# Patient Record
Sex: MALE | Race: WHITE | NOT HISPANIC OR LATINO | Employment: OTHER | URBAN - NONMETROPOLITAN AREA
[De-identification: names, ages, dates, MRNs, and addresses within clinical notes are randomized per-mention and may not be internally consistent; named-entity substitution may affect disease eponyms.]

---

## 2018-01-25 ENCOUNTER — DOCUMENTATION ONLY (OUTPATIENT)
Dept: FAMILY MEDICINE | Facility: OTHER | Age: 68
End: 2018-01-25

## 2018-01-25 RX ORDER — IBUPROFEN 200 MG
400 TABLET ORAL DAILY PRN
COMMUNITY

## 2018-01-25 RX ORDER — COVID-19 ANTIGEN TEST
1 KIT MISCELLANEOUS DAILY PRN
COMMUNITY

## 2018-01-25 RX ORDER — DOXAZOSIN 4 MG/1
4 TABLET ORAL AT BEDTIME
COMMUNITY

## 2018-01-25 RX ORDER — ACETAMINOPHEN 325 MG/1
2 TABLET ORAL DAILY PRN
COMMUNITY

## 2021-08-16 ENCOUNTER — TRANSFERRED RECORDS (OUTPATIENT)
Dept: HEALTH INFORMATION MANAGEMENT | Facility: CLINIC | Age: 71
End: 2021-08-16

## 2021-10-05 ENCOUNTER — TELEPHONE (OUTPATIENT)
Dept: PET IMAGING | Facility: HOSPITAL | Age: 71
End: 2021-10-05

## 2021-10-06 ENCOUNTER — HOSPITAL ENCOUNTER (OUTPATIENT)
Dept: PET IMAGING | Facility: HOSPITAL | Age: 71
Discharge: HOME | End: 2021-10-06
Attending: FAMILY MEDICINE | Admitting: FAMILY MEDICINE
Payer: COMMERCIAL

## 2021-10-06 DIAGNOSIS — R91.8 LUNG MASS: ICD-10-CM

## 2021-10-06 PROCEDURE — 343N000001 HC RX 343: Performed by: RADIOLOGY

## 2021-10-06 PROCEDURE — 78815 PET IMAGE W/CT SKULL-THIGH: CPT | Mod: PI

## 2021-10-06 PROCEDURE — A9552 F18 FDG: HCPCS | Performed by: RADIOLOGY

## 2021-10-06 RX ADMIN — FLUDEOXYGLUCOSE F-18 12.59 MCI.: 500 INJECTION, SOLUTION INTRAVENOUS at 08:16

## 2023-03-08 ENCOUNTER — TRANSFERRED RECORDS (OUTPATIENT)
Dept: HEALTH INFORMATION MANAGEMENT | Facility: OTHER | Age: 73
End: 2023-03-08
Payer: COMMERCIAL

## 2023-03-08 ENCOUNTER — ANCILLARY PROCEDURE (OUTPATIENT)
Dept: RADIOLOGY | Facility: CLINIC | Age: 73
End: 2023-03-08
Payer: COMMERCIAL

## 2023-03-09 ENCOUNTER — TRANSFERRED RECORDS (OUTPATIENT)
Dept: HEALTH INFORMATION MANAGEMENT | Facility: OTHER | Age: 73
End: 2023-03-09

## 2023-03-09 ENCOUNTER — HOSPITAL ENCOUNTER (INPATIENT)
Facility: OTHER | Age: 73
LOS: 4 days | Discharge: HOME | DRG: 390 | End: 2023-03-13
Attending: FAMILY MEDICINE | Admitting: FAMILY MEDICINE
Payer: COMMERCIAL

## 2023-03-09 DIAGNOSIS — K56.609 SMALL BOWEL OBSTRUCTION (H): Primary | ICD-10-CM

## 2023-03-09 PROCEDURE — 99222 1ST HOSP IP/OBS MODERATE 55: CPT | Performed by: FAMILY MEDICINE

## 2023-03-09 PROCEDURE — 120N000001 HC R&B MED SURG/OB

## 2023-03-09 PROCEDURE — 258N000001 HC RX 258: Performed by: FAMILY MEDICINE

## 2023-03-09 RX ORDER — NALOXONE HYDROCHLORIDE 0.4 MG/ML
0.2 INJECTION, SOLUTION INTRAMUSCULAR; INTRAVENOUS; SUBCUTANEOUS
Status: DISCONTINUED | OUTPATIENT
Start: 2023-03-09 | End: 2023-03-13 | Stop reason: HOSPADM

## 2023-03-09 RX ORDER — NALOXONE HYDROCHLORIDE 0.4 MG/ML
0.4 INJECTION, SOLUTION INTRAMUSCULAR; INTRAVENOUS; SUBCUTANEOUS
Status: DISCONTINUED | OUTPATIENT
Start: 2023-03-09 | End: 2023-03-13 | Stop reason: HOSPADM

## 2023-03-09 RX ORDER — HYDROMORPHONE HCL IN WATER/PF 6 MG/30 ML
0.4 PATIENT CONTROLLED ANALGESIA SYRINGE INTRAVENOUS
Status: DISCONTINUED | OUTPATIENT
Start: 2023-03-09 | End: 2023-03-13 | Stop reason: HOSPADM

## 2023-03-09 RX ORDER — HYDROMORPHONE HCL IN WATER/PF 6 MG/30 ML
0.2 PATIENT CONTROLLED ANALGESIA SYRINGE INTRAVENOUS
Status: DISCONTINUED | OUTPATIENT
Start: 2023-03-09 | End: 2023-03-13 | Stop reason: HOSPADM

## 2023-03-09 RX ORDER — ONDANSETRON 2 MG/ML
4 INJECTION INTRAMUSCULAR; INTRAVENOUS EVERY 6 HOURS PRN
Status: DISCONTINUED | OUTPATIENT
Start: 2023-03-09 | End: 2023-03-13 | Stop reason: HOSPADM

## 2023-03-09 RX ORDER — LIDOCAINE 40 MG/G
CREAM TOPICAL
Status: DISCONTINUED | OUTPATIENT
Start: 2023-03-09 | End: 2023-03-13 | Stop reason: HOSPADM

## 2023-03-09 RX ORDER — ONDANSETRON 4 MG/1
4 TABLET, ORALLY DISINTEGRATING ORAL EVERY 6 HOURS PRN
Status: DISCONTINUED | OUTPATIENT
Start: 2023-03-09 | End: 2023-03-13 | Stop reason: HOSPADM

## 2023-03-09 RX ORDER — DEXTROSE MONOHYDRATE, SODIUM CHLORIDE, AND POTASSIUM CHLORIDE 50; 1.49; 9 G/1000ML; G/1000ML; G/1000ML
100 INJECTION, SOLUTION INTRAVENOUS CONTINUOUS
Status: DISCONTINUED | OUTPATIENT
Start: 2023-03-09 | End: 2023-03-12

## 2023-03-09 RX ADMIN — POTASSIUM CHLORIDE, DEXTROSE MONOHYDRATE AND SODIUM CHLORIDE 100 ML/HR: 150; 5; 900 INJECTION, SOLUTION INTRAVENOUS at 20:59

## 2023-03-09 ASSESSMENT — ACTIVITIES OF DAILY LIVING (ADL)
ADLS_ACUITY_SCORE: 20
ADLS_ACUITY_SCORE: 20

## 2023-03-10 LAB
ALBUMIN SERPL BCG-MCNC: 3.3 G/DL (ref 3.5–5.2)
ALP SERPL-CCNC: 87 U/L (ref 40–129)
ALT SERPL W P-5'-P-CCNC: 15 U/L (ref 10–50)
ANION GAP SERPL CALCULATED.3IONS-SCNC: 4 MMOL/L (ref 7–15)
AST SERPL W P-5'-P-CCNC: 15 U/L (ref 10–50)
BILIRUB SERPL-MCNC: 0.6 MG/DL
BUN SERPL-MCNC: 18.4 MG/DL (ref 8–23)
CALCIUM SERPL-MCNC: 8.6 MG/DL (ref 8.8–10.2)
CHLORIDE SERPL-SCNC: 107 MMOL/L (ref 98–107)
CREAT SERPL-MCNC: 0.83 MG/DL (ref 0.67–1.17)
DEPRECATED HCO3 PLAS-SCNC: 30 MMOL/L (ref 22–29)
ERYTHROCYTE [DISTWIDTH] IN BLOOD BY AUTOMATED COUNT: 13.2 % (ref 10–15)
GFR SERPL CREATININE-BSD FRML MDRD: >90 ML/MIN/1.73M2
GLUCOSE SERPL-MCNC: 125 MG/DL (ref 70–99)
HCT VFR BLD AUTO: 44.2 % (ref 40–53)
HGB BLD-MCNC: 15.2 G/DL (ref 13.3–17.7)
MCH RBC QN AUTO: 30.8 PG (ref 26.5–33)
MCHC RBC AUTO-ENTMCNC: 34.4 G/DL (ref 31.5–36.5)
MCV RBC AUTO: 90 FL (ref 78–100)
PLATELET # BLD AUTO: 148 10E3/UL (ref 150–450)
POTASSIUM SERPL-SCNC: 4.3 MMOL/L (ref 3.4–5.3)
PROT SERPL-MCNC: 5.6 G/DL (ref 6.4–8.3)
RBC # BLD AUTO: 4.94 10E6/UL (ref 4.4–5.9)
SODIUM SERPL-SCNC: 141 MMOL/L (ref 136–145)
WBC # BLD AUTO: 10 10E3/UL (ref 4–11)

## 2023-03-10 PROCEDURE — 36415 COLL VENOUS BLD VENIPUNCTURE: CPT | Performed by: FAMILY MEDICINE

## 2023-03-10 PROCEDURE — 99233 SBSQ HOSP IP/OBS HIGH 50: CPT | Performed by: STUDENT IN AN ORGANIZED HEALTH CARE EDUCATION/TRAINING PROGRAM

## 2023-03-10 PROCEDURE — 99222 1ST HOSP IP/OBS MODERATE 55: CPT | Mod: 25 | Performed by: SURGERY

## 2023-03-10 PROCEDURE — 80053 COMPREHEN METABOLIC PANEL: CPT | Performed by: FAMILY MEDICINE

## 2023-03-10 PROCEDURE — 250N000011 HC RX IP 250 OP 636: Performed by: FAMILY MEDICINE

## 2023-03-10 PROCEDURE — 85027 COMPLETE CBC AUTOMATED: CPT | Performed by: FAMILY MEDICINE

## 2023-03-10 PROCEDURE — 258N000001 HC RX 258: Performed by: FAMILY MEDICINE

## 2023-03-10 PROCEDURE — 120N000001 HC R&B MED SURG/OB

## 2023-03-10 RX ADMIN — HYDROMORPHONE HYDROCHLORIDE 0.4 MG: 0.2 INJECTION, SOLUTION INTRAMUSCULAR; INTRAVENOUS; SUBCUTANEOUS at 19:05

## 2023-03-10 RX ADMIN — POTASSIUM CHLORIDE, DEXTROSE MONOHYDRATE AND SODIUM CHLORIDE 100 ML/HR: 150; 5; 900 INJECTION, SOLUTION INTRAVENOUS at 06:34

## 2023-03-10 RX ADMIN — HYDROMORPHONE HYDROCHLORIDE 0.4 MG: 0.2 INJECTION, SOLUTION INTRAMUSCULAR; INTRAVENOUS; SUBCUTANEOUS at 21:35

## 2023-03-10 RX ADMIN — HYDROMORPHONE HYDROCHLORIDE 0.4 MG: 0.2 INJECTION, SOLUTION INTRAMUSCULAR; INTRAVENOUS; SUBCUTANEOUS at 14:37

## 2023-03-10 RX ADMIN — HYDROMORPHONE HYDROCHLORIDE 0.4 MG: 0.2 INJECTION, SOLUTION INTRAMUSCULAR; INTRAVENOUS; SUBCUTANEOUS at 01:16

## 2023-03-10 RX ADMIN — POTASSIUM CHLORIDE, DEXTROSE MONOHYDRATE AND SODIUM CHLORIDE 100 ML/HR: 150; 5; 900 INJECTION, SOLUTION INTRAVENOUS at 16:46

## 2023-03-10 RX ADMIN — HYDROMORPHONE HYDROCHLORIDE 0.4 MG: 0.2 INJECTION, SOLUTION INTRAMUSCULAR; INTRAVENOUS; SUBCUTANEOUS at 06:19

## 2023-03-10 ASSESSMENT — ACTIVITIES OF DAILY LIVING (ADL)
ADLS_ACUITY_SCORE: 22
ADLS_ACUITY_SCORE: 20
ADLS_ACUITY_SCORE: 22
ADLS_ACUITY_SCORE: 20

## 2023-03-10 NOTE — ASSESSMENT & PLAN NOTE
Presenting from [facility] where he has been treated overnight for small bowel obstruction  Similar presentation in 2016 which resolved with Gastrografin, no problems since  Previous surgeries with appendectomy and right inguinal hernia repair in the past  CT imaging on March 8 showing small bowel obstruction  Attempt with Gastrografin today with repeat study showing no passage of dye past the obstruction  Transferred here, general surgery aware and will see in the morning  Continue NG decompression, IV fluids and pain management

## 2023-03-10 NOTE — PROVIDER NOTIFICATION
03/09/23 2232   Valuables   Patient Belongings remains with patient   Patient Belongings Remaining with Patient cell phone/electronics;clothing   Did you bring any home meds/supplements to the hospital?  No       A               Admission:  I am responsible for any personal items that are not sent to the safe or pharmacy.  [facility] is not responsible for loss, theft or damage of any property in my possession.    Signature:  _________________________________ Date: _______  Time: _____                                              Staff Signature:  ____________________________ Date: ________  Time: _____      2nd Staff person, if patient is unable/unwilling to sign:    Signature: ________________________________ Date: ________  Time: _____     Discharge:  [facility] has returned all of my personal belongings:    Signature: _________________________________ Date: ________  Time: _____                                          Staff Signature:  ____________________________ Date: ________  Time: _____

## 2023-03-10 NOTE — PROGRESS NOTES
SAFETY CHECKLIST  ID Bands and Risk clasps correct and in place (DNR, Fall risk, Allergy, Latex, Limb):  Yes  All Lines Reconciled and labeled correctly: Yes  Whiteboard updated:Yes  Environmental interventions: Yes  Verify Tele #: n/a

## 2023-03-10 NOTE — PLAN OF CARE
Goal Outcome Evaluation:      Plan of Care Reviewed With: patient    Overall Patient Progress: no change    Outcome Evaluation: Abdominal pain and discomfort    Patient is alert and oriented, calm and cooperative. Patient states some abdominal discomfort and intermittent nausea, NG tube set to low-intermittent. Patient ambulated in hallway.

## 2023-03-10 NOTE — H&P
[facility]    History and Physical - Hospitalist Service       Date of Admission:  3/9/2023    Assessment & Plan      Darvin Baird is a 72 year old male admitted on 3/9/2023. He has been transferred from [facility] where he has been treated and evaluated for small bowel obstruction.  Symptoms started acutely yesterday bringing him to the hospital with CT imaging showing small bowel obstruction.  He was treated conservatively overnight with NG decompression and today Gastrografin studies were done which did not show any migration of the dye past the stomach and pylorus.  He has been transferred here for surgical consultation, Dr. Scott Lees is aware and will see him in the morning.  Patient has had similar symptoms in the past, last time in 2016 which resolved after Gastrografin study.  Previous surgeries include appendectomy and right inguinal hernia repair.  Patient will be admitted, treated with IV fluids, pain management with surgical consultation in the a.m.    SBO (small bowel obstruction) (H)  Presenting from [facility] where he has been treated overnight for small bowel obstruction  Similar presentation in 2016 which resolved with Gastrografin, no problems since  Previous surgeries with appendectomy and right inguinal hernia repair in the past  CT imaging on March 8 showing small bowel obstruction  Attempt with Gastrografin today with repeat study showing no passage of dye past the obstruction  Transferred here, general surgery aware and will see in the morning  Continue NG decompression, IV fluids and pain management         Diet: NPO for Medical/Clinical Reasons Except for: Meds    DVT Prophylaxis: Pneumatic Compression Devices  Ramires Catheter: Not present  Lines: None     Cardiac Monitoring: None  Code Status: Full Code      Clinically Significant Risk Factors Present on Admission                  # Hypertension: home medication list includes antihypertensive(s)               Disposition Plan      Expected Discharge Date: 03/11/2023                  Maldonado Swanson MD  Hospitalist Service  [facility]  Securely message with [employer] (more info)  Text page via [facility] Paging/Directory     ______________________________________________________________________    Chief Complaint   72-year-old presenting with abdominal pain    History is obtained from the patient, electronic health record and emergency department physician    History of Present Illness   Darvin Baird is a 72 year old male who presents to [facility] with acute onset of abdominal pain yesterday.  Pain was located across the abdomen associate with abdominal distention nausea with vomiting.  Similar to pain that he has experienced in the past, last time in 2016 when he was diagnosed with small bowel obstruction.  Patient has a previous history of appendectomy and right inguinal hernia repair.  In 2016 he was transferred to this hospital for surgical evaluation and [place] pain went away and Gastrografin studies done here showed movement of the dye through and he was discharged shortly thereafter.  He denies any recurrence of the symptoms in the past number of years.  Otherwise in good health takes doxazosin for history of prostate enlargement.  He denies fever, chills, urinary symptoms at this time.      Past Medical History    Past Medical History:   Diagnosis Date     Enlarged prostate without lower urinary tract symptoms (luts)     No Comments Provided       Past Surgical History   Past Surgical History:   Procedure Laterality Date     APPENDECTOMY OPEN      ruptured     COLONOSCOPY      2012,Colonoscopy     OTHER SURGICAL HISTORY      [id number],TOTAL HIP ARTHROPLASTY     OTHER SURGICAL HISTORY      [id number],INGUINAL HERNIA REPAIR       Prior to Admission Medications   Prior to Admission Medications   Prescriptions Last Dose Informant Patient Reported?  Taking?   acetaminophen (TYLENOL) 325 MG tablet   Yes No   Sig: Take 2 tablets by mouth daily as needed (aches and pains)   doxazosin (CARDURA) 4 MG tablet   Yes No   Sig: Take 4 mg by mouth At Bedtime   ibuprofen (ADVIL/MOTRIN) 200 MG tablet   Yes No   Sig: Take 400 mg by mouth daily as needed (aches and pains)   melatonin 5 MG tablet   Yes No   Sig: Take 5 mg by mouth At Bedtime   naproxen sodium 220 MG capsule   Yes No   Sig: Take 1 capsule by mouth daily as needed (aches and pains)      Facility-Administered Medications: None        Review of Systems    All other systems reviewed and negative other than noted in the HPI or here.       Social History   I have reviewed this patient's social history and updated it with pertinent information if needed.  Social History     Tobacco Use     Smoking status: Former     Years: 18.00     Types: Cigarettes     Smokeless tobacco: Former     Quit date: 1/1/1989   Substance Use Topics     Alcohol use: No     Drug use: Unknown     Types: Other     Comment: Drug use: No       Family History     No significant family history, including no history of: cancers      Physical Exam   Vital Signs:     BP: 121/58 Pulse: 52   Resp: 16 SpO2: 94 % O2 Device: None (Room air)    Weight: 0 lbs 0 oz    General Appearance: Lying in bed, nasogastric decompression in place, no obvious distress  Eyes: Pupils equal, reactive, intact  HEENT: Normocephalic, nose mouth throat normal  Respiratory: Lungs are clear  Cardiovascular: Regular rate and rhythm, no murmur heard  GI: Soft, nondistended, mild diffuse tenderness, no rebound  Lymph/Hematologic: Cervical nodes negative  Genitourinary: Not examined  Skin: No lesions, rashes or bruising  Musculoskeletal: Moving arms legs without difficulty  Neurologic: No focal deficits  Psychiatric: Mood and affect are normal    Medical Decision Making       60 MINUTES SPENT BY ME on the date of service doing chart review, history, exam, documentation & further  activities per the note.      Data   Labs reviewed from [facility] showing a white count of 11,700, normal hemoglobin and normal BMP.  CT imaging evidently has been pushed to our hospital so surgery can review.

## 2023-03-10 NOTE — PLAN OF CARE
Goal Outcome Evaluation:           Overall Patient Progress: no change     Patient denies pain at present. NG tube in place connected to intermittent suction with small amount of output. IV intact infusing, NPO. Resting Medication given X2 through the night tolerating well.

## 2023-03-10 NOTE — PROGRESS NOTES
[facility]    Medicine Progress Note - Hospitalist Service    Date of Admission:  3/9/2023    Assessment & Plan      Darvin Baird is a 72 year old male admitted on 3/9/2023. He has been transferred from [facility] where he has been treated and evaluated for small bowel obstruction.  Symptoms started acutely yesterday bringing him to the hospital with CT imaging showing small bowel obstruction.  He was treated conservatively overnight with NG decompression and today Gastrografin studies were done which did not show any migration of the dye past the stomach and pylorus.  He has been transferred here for surgical consultation, Dr. Scott Lees is aware and will see him in the morning.  Patient has had similar symptoms in the past, last time in 2016 which resolved after Gastrografin study.  Previous surgeries include appendectomy and right inguinal hernia repair.  Patient will be admitted, treated with IV fluids, pain management and plan on conservative management for now with possible surgical intervention if not resolved.       SBO (small bowel obstruction) (H)  Presenting from [facility] where he has been treated overnight for small bowel obstruction  Similar presentation in 2016 which resolved with Gastrografin, no problems since  Previous surgeries with appendectomy and right inguinal hernia repair in the past  CT imaging on March 8 showing small bowel obstruction  Attempt with Gastrografin prior to transfer with repeat study showing no passage of dye past the obstruction  Continue NG decompression, IV fluids and pain management  Plan for ongoing conservative therapy.            Diet: NPO for Medical/Clinical Reasons Except for: Meds    DVT Prophylaxis: Pneumatic Compression Devices  Ramires Catheter: Not present  Lines: None     Cardiac Monitoring: None  Code Status: Full Code      Clinically Significant Risk Factors Present on Admission              # Hypoalbuminemia: Lowest  albumin = 3.3 g/dL at 3/10/2023  5:30 AM, will monitor as appropriate     # Hypertension: home medication list includes antihypertensive(s)              Disposition Plan      Expected Discharge Date: 03/11/2023                  Miguelito Rangel MD  Hospitalist Service  [facility]  Securely message with [employer] (more info)  Text page via [facility] Paging/Directory   ______________________________________________________________________    Interval History   Still feeling nauseated  No chest pain  Abdominal pain still present but improved.   Has not used pain medications for several hours  Was previously upset that could not get surgery today, now more understanding and ok with conservative management.         Physical Exam   Vital Signs: Temp: 99.4  F (37.4  C) Temp src: Tympanic BP: 137/67 Pulse: 71   Resp: 18 SpO2: 90 % O2 Device: None (Room air)    Weight: 0 lbs 0 oz    General: Pleasant 72 year old year old male sitting in clinic in no acute distress   CV: regular rate and rhythm, no murmur, rubs or peripheral edema appreciated  PUlm: CTAP  GI: soft abdomen, rebound tenderness. Active bowel sounds    Medical Decision Making     Discussed with general surgery.   Reviewed his [facility] records  NOTE(S)/MEDICAL RECORDS REVIEWED over the past 24 hours:   Tests ORDERED & REVIEWED in the past 24 hours:      Data     I have personally reviewed the following data over the past 24 hrs:    10.0  \   15.2   / 148 (L)     141 107 18.4 /  125 (H)   4.3 30 (H) 0.83 \       ALT: 15 AST: 15 AP: 87 TBILI: 0.6   ALB: 3.3 (L) TOT PROTEIN: 5.6 (L) LIPASE: N/A       Imaging results reviewed over the past 24 hrs:   No results found for this or any previous visit (from the past 24 hour(s)).

## 2023-03-10 NOTE — CONSULTS
GENERAL SURGERY CONSULTATION NOTE    Darvin Baird   [address]  [place] [address]  72 year old  male  Admission Date/Time: 3/9/2023  8:02 PM  Primary Care Provider:  Jeffy Carrillo      HPI: Darvin is a 72-year-old male who complains of nausea and vomiting.  Has a history of a small bowel obstruction treated conservatively in the past.  Apparently that one opened up while he was being [place] [address]  He is a little put off by being transferred and not getting surgery yet.  We discussed that he still has a very good chance of recovering without surgery despite failing a Gastrografin challenge.    REVIEW OF SYSTEMS:    GENERAL: No fevers or chills. Denies fatigue, recent weight loss.  HEENT: No sinus drainage. No changes with vision or hearing. No difficulty swallowing.   LYMPHATICS:  Noswollen nodes in axilla, neck or groin.  CARDIOVASCULAR: Denies chest pain, palpitations and dyspnea on exertion.  PULMONARY: No shortness of breath or cough. No increase in sputum production.  GI: Denies melena,bright red blood in stools. No hematemesis. No constipation or diarrhea.  : No dysuria or hematuria.  SKIN: No recent rashes or ulcers.   HEMATOLOGY:  No history of easy bruising or bleeding.  ENDOCRINE:  Nohistory of diabetes or thyroid problems.  NEUROLOGY:  No history of seizures or headaches. No motor or sensory changes.    Patient Active Problem List   Diagnosis     SBO (small bowel obstruction) (H)     Small bowel obstruction (H)        Past Medical History:   Diagnosis Date     Enlarged prostate without lower urinary tract symptoms (luts)     No Comments Provided       Past Surgical History:   Procedure Laterality Date     APPENDECTOMY OPEN      ruptured     COLONOSCOPY      2012,Colonoscopy     OTHER SURGICAL HISTORY      [id number],TOTAL HIP ARTHROPLASTY     OTHER SURGICAL HISTORY      [id number],INGUINAL HERNIA REPAIR        No family history on file.     Social History     Socioeconomic  History     Marital status:      Spouse name: Not on file     Number of children: Not on file     Years of education: Not on file     Highest education level: Not on file   Occupational History     Not on file   Tobacco Use     Smoking status: Former     Years: 18.00     Types: Cigarettes     Smokeless tobacco: Former     Quit date: 1/1/1989   Substance and Sexual Activity     Alcohol use: No     Drug use: Unknown     Types: Other     Comment: Drug use: No     Sexual activity: Not on file   Other Topics Concern     Not on file   Social History Narrative    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  acetaminophen (TYLENOL) 325 MG tablet, Take 2 tablets by mouth daily as needed (aches and pains)  Ascorbic Acid (VITAMIN C PO), Take 1 each by mouth daily  Cholecalciferol (VITAMIN D-3 PO), Take 1 each by mouth daily  doxazosin (CARDURA) 4 MG tablet, Take 4 mg by mouth At Bedtime  ibuprofen (ADVIL/MOTRIN) 200 MG tablet, Take 400 mg by mouth daily as needed (aches and pains)  MAGNESIUM PO, Take 1 each by mouth daily  melatonin 5 MG tablet, Take 5 mg by mouth At Bedtime  Multiple Vitamins-Minerals (ZINC PO), Take 1 each by mouth daily  naproxen sodium 220 MG capsule, Take 1 capsule by mouth daily as needed (aches and pains)          ALLERGIES/SENSITIVITIES: No Known Allergies    PHYSICAL EXAM:     /67 (BP Location: Left arm, Patient Position: Semi-Sarmiento's, Cuff Size: Adult Regular)   Pulse 71   Temp 99.4  F (37.4  C) (Tympanic)   Resp 18   SpO2 90%     General Appearance:   Appears stated age  Heart & CV:  RRR no  murmur.  Intact distal pulses, good cap refill.  LUNGS:  CTA B/L, no wheezing or crackles.  Abd: Mildly distended with diffuse mild tenderness.  NG is producing dark bilious fluid  Ext: No deformity      ADDITIONAL COMMENTS:      CONSULTATION ASSESSMENT AND PLAN:    72 year old male with small bowel obstruction.  Need to get outside films available for review.  Still would proceed for a day or 2 with nonoperative management.  If not resolving by Sunday may be we should intervene surgically.       NPO/IVF/NGT  Up and ambulate at least 5-6 times per day.  Pain control, GI and DVT prophylaxis with SCDs while in bed, ambulate  Good pulm toilet  Check AM labs  No indication for surgical intervention at this time, most likely will resolve on its own.  Await return of bowel fx.  D/w pt and wife and family, ?'s answered.  They appear to understand and wish to proceed with this plan of care.    Scott Lees MD   General Surgery

## 2023-03-10 NOTE — PHARMACY-ADMISSION MEDICATION HISTORY
Pharmacy -- Admission Medication Reconciliation    Prior to admission (PTA) medications were reviewed and the patient's PTA medication list was updated.    Sources Consulted: chart review, sure scripts, patient interview    The reliability of this Medication Reconciliation is: Reliability: Reliable    The following significant changes were made:    Added:    Vitamin D3    Vitamin C    Zinc    Magnesium    Note: patient stated he took at doxazosin this morning from his home supply. Patient got upset and feisty when told the policy on taking medications in the hospital. Explained that he did not have an order for the medication and that medications come from the inpatient pharmacy. Patient refused to make eye contact and ignored my explanation.     In addition, the patient's allergies were reviewed with the patient and updated as follows:   Allergies: Patient has no known allergies.    The pharmacist has reviewed with the patient that all personal medications should be removed from the building or locked in the belongings safe.  Patient shall only take medications ordered by the physician and administered by the nursing staff.     Medication barriers identified: none noted    Medication adherence concerns: none    Understanding of emergency medications: n/a   Medication Affordability:  affordable    Mary Britt RPH, 3/10/2023,  10:10 AM

## 2023-03-10 NOTE — PROGRESS NOTES
[facility] ADMISSION NOTE    Patient admitted to room 333 at approximately 2000 via cart from   [facility]. Patient was accompanied by other:transport team    Verbal SBAR report received from Juliocesar NAGY prior to patient arrival.      Patient alert and oriented X 3. The patient is not having any pain.  . Admission vital signs: Blood pressure 121/58, pulse 52, resp. rate 16, SpO2 94 %. Patient was oriented to plan of care, call light, bed controls, tv, telephone, bathroom and visiting hours.     Risk Assessment    The following safety risks were identified during admission: fall. Yellow risk band applied: YES.           Kandy Haque RN

## 2023-03-11 ENCOUNTER — APPOINTMENT (OUTPATIENT)
Dept: GENERAL RADIOLOGY | Facility: OTHER | Age: 73
DRG: 390 | End: 2023-03-11
Attending: SURGERY
Payer: COMMERCIAL

## 2023-03-11 LAB
ALBUMIN SERPL BCG-MCNC: 3 G/DL (ref 3.5–5.2)
ALP SERPL-CCNC: 75 U/L (ref 40–129)
ALT SERPL W P-5'-P-CCNC: 13 U/L (ref 10–50)
ANION GAP SERPL CALCULATED.3IONS-SCNC: 4 MMOL/L (ref 7–15)
AST SERPL W P-5'-P-CCNC: 13 U/L (ref 10–50)
BILIRUB SERPL-MCNC: 0.7 MG/DL
BUN SERPL-MCNC: 19 MG/DL (ref 8–23)
CALCIUM SERPL-MCNC: 8.4 MG/DL (ref 8.8–10.2)
CHLORIDE SERPL-SCNC: 105 MMOL/L (ref 98–107)
CREAT SERPL-MCNC: 0.77 MG/DL (ref 0.67–1.17)
DEPRECATED HCO3 PLAS-SCNC: 30 MMOL/L (ref 22–29)
ERYTHROCYTE [DISTWIDTH] IN BLOOD BY AUTOMATED COUNT: 13 % (ref 10–15)
GFR SERPL CREATININE-BSD FRML MDRD: >90 ML/MIN/1.73M2
GLUCOSE SERPL-MCNC: 124 MG/DL (ref 70–99)
HCT VFR BLD AUTO: 42.7 % (ref 40–53)
HGB BLD-MCNC: 14.4 G/DL (ref 13.3–17.7)
MCH RBC QN AUTO: 30.4 PG (ref 26.5–33)
MCHC RBC AUTO-ENTMCNC: 33.7 G/DL (ref 31.5–36.5)
MCV RBC AUTO: 90 FL (ref 78–100)
PLATELET # BLD AUTO: 137 10E3/UL (ref 150–450)
POTASSIUM SERPL-SCNC: 3.7 MMOL/L (ref 3.4–5.3)
PROT SERPL-MCNC: 5.4 G/DL (ref 6.4–8.3)
RBC # BLD AUTO: 4.73 10E6/UL (ref 4.4–5.9)
SODIUM SERPL-SCNC: 139 MMOL/L (ref 136–145)
WBC # BLD AUTO: 8.3 10E3/UL (ref 4–11)

## 2023-03-11 PROCEDURE — 80053 COMPREHEN METABOLIC PANEL: CPT | Performed by: STUDENT IN AN ORGANIZED HEALTH CARE EDUCATION/TRAINING PROGRAM

## 2023-03-11 PROCEDURE — 250N000013 HC RX MED GY IP 250 OP 250 PS 637: Performed by: SURGERY

## 2023-03-11 PROCEDURE — 36415 COLL VENOUS BLD VENIPUNCTURE: CPT | Performed by: STUDENT IN AN ORGANIZED HEALTH CARE EDUCATION/TRAINING PROGRAM

## 2023-03-11 PROCEDURE — 120N000001 HC R&B MED SURG/OB

## 2023-03-11 PROCEDURE — 258N000001 HC RX 258: Performed by: FAMILY MEDICINE

## 2023-03-11 PROCEDURE — 74018 RADEX ABDOMEN 1 VIEW: CPT | Mod: TC

## 2023-03-11 PROCEDURE — 99232 SBSQ HOSP IP/OBS MODERATE 35: CPT | Performed by: STUDENT IN AN ORGANIZED HEALTH CARE EDUCATION/TRAINING PROGRAM

## 2023-03-11 PROCEDURE — 99232 SBSQ HOSP IP/OBS MODERATE 35: CPT | Mod: 25 | Performed by: SURGERY

## 2023-03-11 PROCEDURE — 85027 COMPLETE CBC AUTOMATED: CPT | Performed by: STUDENT IN AN ORGANIZED HEALTH CARE EDUCATION/TRAINING PROGRAM

## 2023-03-11 PROCEDURE — 250N000011 HC RX IP 250 OP 636: Performed by: FAMILY MEDICINE

## 2023-03-11 RX ORDER — DOXAZOSIN 4 MG/1
4 TABLET ORAL AT BEDTIME
Status: DISCONTINUED | OUTPATIENT
Start: 2023-03-11 | End: 2023-03-11

## 2023-03-11 RX ORDER — DOXAZOSIN 4 MG/1
4 TABLET ORAL DAILY
Status: DISCONTINUED | OUTPATIENT
Start: 2023-03-11 | End: 2023-03-13 | Stop reason: HOSPADM

## 2023-03-11 RX ADMIN — POTASSIUM CHLORIDE, DEXTROSE MONOHYDRATE AND SODIUM CHLORIDE 100 ML/HR: 150; 5; 900 INJECTION, SOLUTION INTRAVENOUS at 01:55

## 2023-03-11 RX ADMIN — DIATRIZOATE MEGLUMINE AND DIATRIZOATE SODIUM 75 ML: 660; 100 SOLUTION ORAL; RECTAL at 21:54

## 2023-03-11 RX ADMIN — POTASSIUM CHLORIDE, DEXTROSE MONOHYDRATE AND SODIUM CHLORIDE 100 ML/HR: 150; 5; 900 INJECTION, SOLUTION INTRAVENOUS at 22:05

## 2023-03-11 RX ADMIN — HYDROMORPHONE HYDROCHLORIDE 0.4 MG: 0.2 INJECTION, SOLUTION INTRAMUSCULAR; INTRAVENOUS; SUBCUTANEOUS at 06:36

## 2023-03-11 RX ADMIN — DOXAZOSIN 4 MG: 4 TABLET ORAL at 11:16

## 2023-03-11 RX ADMIN — HYDROMORPHONE HYDROCHLORIDE 0.4 MG: 0.2 INJECTION, SOLUTION INTRAMUSCULAR; INTRAVENOUS; SUBCUTANEOUS at 08:33

## 2023-03-11 RX ADMIN — ONDANSETRON 4 MG: 2 INJECTION INTRAMUSCULAR; INTRAVENOUS at 20:23

## 2023-03-11 RX ADMIN — HYDROMORPHONE HYDROCHLORIDE 0.2 MG: 0.2 INJECTION, SOLUTION INTRAMUSCULAR; INTRAVENOUS; SUBCUTANEOUS at 19:58

## 2023-03-11 RX ADMIN — HYDROMORPHONE HYDROCHLORIDE 0.4 MG: 0.2 INJECTION, SOLUTION INTRAMUSCULAR; INTRAVENOUS; SUBCUTANEOUS at 01:41

## 2023-03-11 RX ADMIN — POTASSIUM CHLORIDE, DEXTROSE MONOHYDRATE AND SODIUM CHLORIDE 100 ML/HR: 150; 5; 900 INJECTION, SOLUTION INTRAVENOUS at 11:53

## 2023-03-11 ASSESSMENT — ACTIVITIES OF DAILY LIVING (ADL)
ADLS_ACUITY_SCORE: 24
ADLS_ACUITY_SCORE: 23
ADLS_ACUITY_SCORE: 24
ADLS_ACUITY_SCORE: 23
ADLS_ACUITY_SCORE: 22
ADLS_ACUITY_SCORE: 24
ADLS_ACUITY_SCORE: 23
ADLS_ACUITY_SCORE: 23

## 2023-03-11 NOTE — PROGRESS NOTES
[facility]    Medicine Progress Note - Hospitalist Service    Date of Admission:  3/9/2023    Assessment & Plan      Darvin Baird is a 72 year old male admitted on 3/9/2023. He has been transferred from [facility] where he has been treated and evaluated for small bowel obstruction.  Symptoms started acutely yesterday bringing him to the hospital with CT imaging showing small bowel obstruction.  He was treated conservatively overnight with NG decompression and today Gastrografin studies were done which did not show any migration of the dye past the stomach and pylorus.  He has been transferred here for surgical consultation, Dr. Scott Lees is aware and will see him in the morning.  Patient has had similar symptoms in the past, last time in 2016 which resolved after Gastrografin study.  Previous surgeries include appendectomy and right inguinal hernia repair.  Patient will be admitted, treated with IV fluids, pain management.  Overnight will undergo Gastrografin challenge and continuing NG tube to low intermittent suction currently.      SBO (small bowel obstruction) (H)  Assessment: Small bowel obstruction  transfer from outside hospital for possible surgical management.  Has had some improvement in his bowel function passing occasional gas.  Continue conservative management we will attempt Gastrografin challenge overnight.  -D5 normal saline with 20 of K at 100 an hour  -Gastrografin challenge overnight  -NG tube to low intermittent suction as needed hydromorphone  -0.2 to 0.4 mg every 2 hours as needed  -General surgery following  -SCDs      BPH  -Resume home doxazosin       Diet: NPO for Medical/Clinical Reasons Except for: Meds    DVT Prophylaxis: Pneumatic Compression Devices  Ramires Catheter: Not present  Lines: None     Cardiac Monitoring: None  Code Status: Full Code      Clinically Significant Risk Factors              # Hypoalbuminemia: Lowest albumin = 3 g/dL at 3/11/2023   6:00 AM, will monitor as appropriate                    Disposition Plan           Awaiting resolution of small bowel obstruction.  If fails conservative management may require operative management on 3/12 or 3/13.  Miguelito Rangel MD  Hospitalist Service  [facility]  Securely message with [employer] (more info)  Text page via [facility] Paging/Directory   ______________________________________________________________________    Interval History   Nausea somewhat improved.  Pain improving and under good control.  Did have 1 episode of passing gas today.  Very much would like to start his doxazosin to help him urinate.        Physical Exam   Vital Signs: Temp: 99.2  F (37.3  C) Temp src: Tympanic BP: 138/70 Pulse: 62   Resp: 16 SpO2: 93 % O2 Device: None (Room air)    Weight: 190 lbs 8 oz    General: Pleasant 72 year old year old male sitting in bed in no acute distress   CV: regular rate and rhythm, no murmur, rubs or peripheral edema appreciated  PUlm: CTAP  GI: soft abdomen, rebound tenderness. Active bowel sounds    Medical Decision Making     Discussed with general surgery.     .  NOTE(S)/MEDICAL RECORDS REVIEWED over the past 24 hours:   Tests ORDERED & REVIEWED in the past 24 hours:      Data     I have personally reviewed the following data over the past 24 hrs:    8.3  \   14.4   / 137 (L)     139 105 19.0 /  124 (H)   3.7 30 (H) 0.77 \       ALT: 13 AST: 13 AP: 75 TBILI: 0.7   ALB: 3.0 (L) TOT PROTEIN: 5.4 (L) LIPASE: N/A       Imaging results reviewed over the past 24 hrs:   No results found for this or any previous visit (from the past 24 hour(s)).

## 2023-03-11 NOTE — PLAN OF CARE
Goal Outcome Evaluation:      Plan of Care Reviewed With: patient    Overall Patient Progress: improvingOverall Patient Progress: improving    Outcome Evaluation: Less abdominal discomfort    Patient is alert and oriented, calm and cooperative. Patient is vitally stable, less nauseous today, Patient has utilized PRN pain meds.. Patient ambulated in hallways through out day and sat up in his chair.

## 2023-03-11 NOTE — CONSULTS
PROGRESS NOTE    cc: Small bowel obstruction    HPI: Darvin is a 72-year-old male who has been admitted since Tuesday and then transferred to [facility] for small bowel obstruction.  He had 2 CT scans while in [place].  The first was nondescript.  The second showed some feculent sedation and edema of the bowel.  He also had some free fluid.  He had no free air or evidence of ischemia.    He is started to pass some flatus.  He has less abdominal pain today.  His NG is transition to be dark and bilious rather than feculent    EXAM:  Date 23 0700 - 23 0659   Shift 8121-8208 8433-1428 7758-8412 24 Hour Total   INTAKE   Shift Total(mL/kg)       OUTPUT   Urine 200   200   Shift Total(mL/kg) 200(2.31)   200(2.31)   Weight (kg) 86.41 86.41 86.41 86.41      Temp: 99.6  F (37.6  C) Temp  Min: 99.2  F (37.3  C)  Max: 99.6  F (37.6  C)  Resp: 18 Resp  Min: 16  Max: 18  SpO2: 93 % SpO2  Min: 93 %  Max: 94 %  Pulse: 63 Pulse  Min: 62  Max: 75    No data recorded  BP: (!) 140/65   Systolic (24hrs), Av , Min:137 , Max:143   Diastolic (24hrs), Av, Min:64, Max:70      GENERAL: alert, NAD  CV: RRR, no murmurs  RESPIRATORY: no dyspnea, clear bilat  ABDOMEN: non-distended, +BS, soft, non-tender   EXTREMITY: no edema, neg Be's  SKIN: warm and dry, no jaundice no rashes  NEURO: cranial nerves II-XII grossly intact,motor exam intact    LABS  Recent Labs   Lab Test 23  0600 03/10/23  0530 10/12/16  0530 10/11/16  0530   WBC 8.3 10.0  --   --    RBC 4.73 4.94  --   --    HGB 14.4 15.2 13.7 15.0   HCT 42.7 44.2 39.5 43.9   MCV 90 90 88 87   MCH 30.4 30.8 30.5 29.7   MCHC 33.7 34.4 34.6 34.2   * 148* 126* 148   MPV  --   --  6.9 7.0       Recent Labs   Lab Test 23  0600 03/10/23  0530   POTASSIUM 3.7 4.3   CHLORIDE 105 107   BUN 19.0 18.4     Recent Labs   Lab Test 23  0600 03/10/23  0530   BILITOTAL 0.7 0.6   AST 13 15   ALT 13 15       IMAGING  I personally reviewed patient's CT scans from  [place].    ASSESSMENT  72-year-old male with small bowel obstruction.  Appears to be improving.  Patient Active Problem List   Diagnosis     SBO (small bowel obstruction) (H)     Small bowel obstruction (H)       PLAN  Gastrografin challenge tonight overnight with soluble contrast    Continue NG to low intermittent suction      Scott Lees MD on 3/11/2023 at 10:42 AM

## 2023-03-11 NOTE — PLAN OF CARE
Goal Outcome Evaluation:      Plan of Care Reviewed With: patient    Overall Patient Progress: no change    Patient NG patent to low intermittent suction.  Pain at tolerable level to patient with use of PRN dilaudid.  Hypoactive bowel sounds, passing flatus, no bowel movements.  Adequate urine output.  VSS, afebrile.

## 2023-03-12 LAB
ANION GAP SERPL CALCULATED.3IONS-SCNC: 5 MMOL/L (ref 7–15)
BUN SERPL-MCNC: 19.7 MG/DL (ref 8–23)
CALCIUM SERPL-MCNC: 8.3 MG/DL (ref 8.8–10.2)
CHLORIDE SERPL-SCNC: 108 MMOL/L (ref 98–107)
CREAT SERPL-MCNC: 0.74 MG/DL (ref 0.67–1.17)
DEPRECATED HCO3 PLAS-SCNC: 29 MMOL/L (ref 22–29)
GFR SERPL CREATININE-BSD FRML MDRD: >90 ML/MIN/1.73M2
GLUCOSE SERPL-MCNC: 122 MG/DL (ref 70–99)
HOLD SPECIMEN: NORMAL
POTASSIUM SERPL-SCNC: 3.9 MMOL/L (ref 3.4–5.3)
SODIUM SERPL-SCNC: 142 MMOL/L (ref 136–145)

## 2023-03-12 PROCEDURE — 36415 COLL VENOUS BLD VENIPUNCTURE: CPT | Performed by: STUDENT IN AN ORGANIZED HEALTH CARE EDUCATION/TRAINING PROGRAM

## 2023-03-12 PROCEDURE — 99232 SBSQ HOSP IP/OBS MODERATE 35: CPT | Mod: 25 | Performed by: SURGERY

## 2023-03-12 PROCEDURE — 250N000013 HC RX MED GY IP 250 OP 250 PS 637: Performed by: STUDENT IN AN ORGANIZED HEALTH CARE EDUCATION/TRAINING PROGRAM

## 2023-03-12 PROCEDURE — 99232 SBSQ HOSP IP/OBS MODERATE 35: CPT | Performed by: STUDENT IN AN ORGANIZED HEALTH CARE EDUCATION/TRAINING PROGRAM

## 2023-03-12 PROCEDURE — 250N000013 HC RX MED GY IP 250 OP 250 PS 637: Performed by: SURGERY

## 2023-03-12 PROCEDURE — 258N000001 HC RX 258: Performed by: FAMILY MEDICINE

## 2023-03-12 PROCEDURE — 80048 BASIC METABOLIC PNL TOTAL CA: CPT | Performed by: STUDENT IN AN ORGANIZED HEALTH CARE EDUCATION/TRAINING PROGRAM

## 2023-03-12 PROCEDURE — 120N000001 HC R&B MED SURG/OB

## 2023-03-12 RX ORDER — LANOLIN ALCOHOL/MO/W.PET/CERES
3 CREAM (GRAM) TOPICAL
Status: DISCONTINUED | OUTPATIENT
Start: 2023-03-12 | End: 2023-03-13 | Stop reason: HOSPADM

## 2023-03-12 RX ADMIN — DOXAZOSIN 4 MG: 4 TABLET ORAL at 09:38

## 2023-03-12 RX ADMIN — Medication 1 ML: at 19:51

## 2023-03-12 RX ADMIN — POTASSIUM CHLORIDE, DEXTROSE MONOHYDRATE AND SODIUM CHLORIDE 100 ML/HR: 150; 5; 900 INJECTION, SOLUTION INTRAVENOUS at 09:38

## 2023-03-12 ASSESSMENT — ACTIVITIES OF DAILY LIVING (ADL)
ADLS_ACUITY_SCORE: 24
ADLS_ACUITY_SCORE: 26
ADLS_ACUITY_SCORE: 24
ADLS_ACUITY_SCORE: 24
ADLS_ACUITY_SCORE: 26
ADLS_ACUITY_SCORE: 24
ADLS_ACUITY_SCORE: 26
ADLS_ACUITY_SCORE: 24
ADLS_ACUITY_SCORE: 26

## 2023-03-12 NOTE — PROGRESS NOTES
PROGRESS NOTE    cc: Small bowel obstruction  S:   Tolerated Gastrografin challenge.  Bowel movements x4-6 this morning.    EXAM:  Date 23 07 - 23 0659   Shift 9571-0895 4853-2196 0389-9133 24 Hour Total   INTAKE   Shift Total(mL/kg)       OUTPUT   Urine 200   200   Shift Total(mL/kg) 200(2.31)   200(2.31)   Weight (kg) 86.41 86.41 86.41 86.41      Temp: 99.7  F (37.6  C) Temp  Min: 99.6  F (37.6  C)  Max: 99.9  F (37.7  C)  Resp: 16 Resp  Min: 16  Max: 18  SpO2: 93 % SpO2  Min: 93 %  Max: 95 %  Pulse: 61 Pulse  Min: 61  Max: 76    No data recorded  BP: 135/65 Systolic (24hrs), Av , Min:135 , Max:155   Diastolic (24hrs), Av, Min:65, Max:77      GENERAL: alert, NAD  CV: RRR, no murmurs  RESPIRATORY: no dyspnea, clear bilat  ABDOMEN: non-distended, +BS, soft, non-tender   EXTREMITY: no edema, neg Be's  SKIN: warm and dry, no jaundice no rashes  NEURO: cranial nerves II-XII grossly intact,motor exam intact    LABS  Recent Labs   Lab Test 23  0600 03/10/23  0530 10/12/16  0530 10/11/16  0530   WBC 8.3 10.0  --   --    RBC 4.73 4.94  --   --    HGB 14.4 15.2 13.7 15.0   HCT 42.7 44.2 39.5 43.9   MCV 90 90 88 87   MCH 30.4 30.8 30.5 29.7   MCHC 33.7 34.4 34.6 34.2   * 148* 126* 148   MPV  --   --  6.9 7.0       Recent Labs   Lab Test 23  0550 23  0600   POTASSIUM 3.9 3.7   CHLORIDE 108* 105   BUN 19.7 19.0     Recent Labs   Lab Test 23  0600 03/10/23  0530   BILITOTAL 0.7 0.6   AST 13 15   ALT 13 15       IMAGING  I personally reviewed patient's CT scans from [place].    ASSESSMENT  72-year-old male with small bowel obstruction.  Appears to be improving.  Patient Active Problem List   Diagnosis     SBO (small bowel obstruction) (H)     Small bowel obstruction (H)       PLAN  NG tube removed  Advance diet as tolerated    Scott Lees MD on 3/12/2023 at 12:25 PM

## 2023-03-12 NOTE — PROGRESS NOTES
[facility]    Medicine Progress Note - Hospitalist Service    Date of Admission:  3/9/2023    Assessment & Plan      Darvin Baird is a 72 year old male admitted on 3/9/2023. He has been transferred from [facility] where he has been treated and evaluated for small bowel obstruction.  Symptoms started acutely yesterday bringing him to the hospital with CT imaging showing small bowel obstruction.  He was treated conservatively overnight with NG decompression and today Gastrografin studies were done which did not show any migration of the dye past the stomach and pylorus.  He has been transferred here for surgical consultation, Dr. Scott Lees is aware and will see him in the morning.  Patient has had similar symptoms in the past, last time in 2016 which resolved after Gastrografin study.  Previous surgeries include appendectomy and right inguinal hernia repair.  Patient will be admitted, treated with IV fluids, pain management.  Underwent gastrografin challenge 3/11/23 with good return of bowel function. Slowly advancing diet      SBO (small bowel obstruction) (H)  Assessment: Small bowel obstruction  transfer from outside hospital for possible surgical management.  Has had some improvement in his bowel function passing occasional gas.  Successful gastrografin overnight. Having BM.   -Stop D5 normal saline with 20 of K at 100 an hour  -Gastrografin challenge overnight successful  -Remove NG tube   -clear liquid diet, advance to fulls for dinner if tolerating  -hydromorphone0.2 to 0.4 mg every 2 hours as needed  -General surgery following  -SCDs      BPH  -Resume home doxazosin       Diet: NPO for Medical/Clinical Reasons Except for: Meds    DVT Prophylaxis: Pneumatic Compression Devices  Ramires Catheter: Not present  Lines: None     Cardiac Monitoring: None  Code Status: Full Code      Clinically Significant Risk Factors              # Hypoalbuminemia: Lowest albumin = 3 g/dL at 3/11/2023   6:00 AM, will monitor as appropriate                    Disposition Plan    Pending resolution of bowel function and tolerate diet. May discharge in 1-2 days.        Miguelito Rangel MD  Hospitalist Service  [facility]  Securely message with Selena (more info)  Text page via [facility] Paging/Directory   ______________________________________________________________________    Interval History   Pain under good control  Had 3 BMs already today.   Feels much better   NG tube removed by surgery.       Physical Exam   Vital Signs: Temp: 99.7  F (37.6  C) Temp src: Tympanic BP: 135/65 Pulse: 61   Resp: 16 SpO2: 93 % O2 Device: None (Room air)    Weight: 190 lbs 9.6 oz    General: Pleasant 72 year old year old male sitting in bed in no acute distress   CV: regular rate and rhythm, no murmur, rubs or peripheral edema appreciated  PUlm: CTAP  GI: soft abdomen, rebound tenderness. Active bowel sounds    Medical Decision Making     Discussed with general surgery.     .  NOTE(S)/MEDICAL RECORDS REVIEWED over the past 24 hours:   Tests ORDERED & REVIEWED in the past 24 hours:      Data     I have personally reviewed the following data over the past 24 hrs:    N/A  \   N/A   / N/A     142 108 (H) 19.7 /  122 (H)   3.9 29 0.74 \       Imaging results reviewed over the past 24 hrs:   Recent Results (from the past 24 hour(s))   XR Gastrografin Challenge    Narrative    PROCEDURE INFORMATION:   Exam: XR Abdomen   Exam date and time: 3/11/2023 8:12 PM   Age: 72 years old   Clinical indication: Constipation; Additional info: Small bowel obstruction     TECHNIQUE:   Imaging protocol: Radiologic exam of the abdomen.   Views:  Multiple sequential frontal views of the abdomen. 1 View.   Other contrast: ng;     COMPARISON:   No relevant prior studies available.     FINDINGS:   Tubes, catheters and devices: Enteric tube present, with tubing curled within   the gastric fundus and tip overlying the proximal duodenum.      Gastrointestinal tract: Dilated centralized small bowel loops measuring up to   5.6 cm. Progression of intraluminal contrast into the small bowel, with   additional dilated contrast noted throughout the large bowel.   Bones/joints: No acute abnormality.   Soft tissues: No soft tissue swelling is seen.       Impression    IMPRESSION:   Dilated small bowel loops, with findings suggestive of ileus versus partial   small bowel obstruction.  Enteric tube as above.    THIS DOCUMENT HAS BEEN ELECTRONICALLY SIGNED BY ALFREDO ELENA MD

## 2023-03-12 NOTE — PLAN OF CARE
Goal Outcome Evaluation:      Plan of Care Reviewed With: patient    Overall Patient Progress: improving    Outcome Evaluation: NG Tube Clamped    Patient is alert and oriented, calm and cooperative. Patient is vitally stable, lungs are clear. NG was removed and IV saline locker, patient tolerated well. Patient denied any pain or nausea. Patient rested and sat up in chair most of the day.

## 2023-03-12 NOTE — PLAN OF CARE
Goal Outcome Evaluation:      Plan of Care Reviewed With: patient    Overall Patient Progress: improving    Patient was able to have NG clamped throughout night after gastrografin administered without needing suction and has denied any nausea.  Had dilaudid once before going to xray and has not needed anything else for pain.  Patient ambulated in [place] and up in recliner early this morning.  Passing flatus, hypoactive bowel sounds.  VSS,afebrile.

## 2023-03-13 VITALS
OXYGEN SATURATION: 96 % | WEIGHT: 190.2 LBS | TEMPERATURE: 99.3 F | SYSTOLIC BLOOD PRESSURE: 118 MMHG | HEART RATE: 60 BPM | DIASTOLIC BLOOD PRESSURE: 61 MMHG | RESPIRATION RATE: 18 BRPM

## 2023-03-13 LAB
ANION GAP SERPL CALCULATED.3IONS-SCNC: 6 MMOL/L (ref 7–15)
BUN SERPL-MCNC: 21.9 MG/DL (ref 8–23)
CALCIUM SERPL-MCNC: 8.1 MG/DL (ref 8.8–10.2)
CHLORIDE SERPL-SCNC: 106 MMOL/L (ref 98–107)
CREAT SERPL-MCNC: 0.78 MG/DL (ref 0.67–1.17)
DEPRECATED HCO3 PLAS-SCNC: 29 MMOL/L (ref 22–29)
GFR SERPL CREATININE-BSD FRML MDRD: >90 ML/MIN/1.73M2
GLUCOSE SERPL-MCNC: 100 MG/DL (ref 70–99)
HOLD SPECIMEN: NORMAL
POTASSIUM SERPL-SCNC: 3.5 MMOL/L (ref 3.4–5.3)
SODIUM SERPL-SCNC: 141 MMOL/L (ref 136–145)

## 2023-03-13 PROCEDURE — 250N000013 HC RX MED GY IP 250 OP 250 PS 637: Performed by: SURGERY

## 2023-03-13 PROCEDURE — 36415 COLL VENOUS BLD VENIPUNCTURE: CPT | Performed by: STUDENT IN AN ORGANIZED HEALTH CARE EDUCATION/TRAINING PROGRAM

## 2023-03-13 PROCEDURE — 99238 HOSP IP/OBS DSCHRG MGMT 30/<: CPT | Performed by: INTERNAL MEDICINE

## 2023-03-13 PROCEDURE — 80048 BASIC METABOLIC PNL TOTAL CA: CPT | Performed by: STUDENT IN AN ORGANIZED HEALTH CARE EDUCATION/TRAINING PROGRAM

## 2023-03-13 RX ORDER — CALCIUM POLYCARBOPHIL 625 MG 625 MG/1
2 TABLET ORAL DAILY
COMMUNITY
Start: 2023-03-13

## 2023-03-13 RX ADMIN — DOXAZOSIN 4 MG: 4 TABLET ORAL at 09:50

## 2023-03-13 ASSESSMENT — ACTIVITIES OF DAILY LIVING (ADL)
ADLS_ACUITY_SCORE: 26
ADLS_ACUITY_SCORE: 21
ADLS_ACUITY_SCORE: 24

## 2023-03-13 NOTE — PLAN OF CARE
Goal Outcome Evaluation:      Plan of Care Reviewed With: patient    Patient met goals will discharge today to home. New medication education given by pharmacy follow appointment in [place] with primary physician after discharge. Nephew called for transport to home. BS active in all quadrants, no pain voiced by patient.

## 2023-03-13 NOTE — PLAN OF CARE
Goal Outcome Evaluation:      Plan of Care Reviewed With: patient    Overall Patient Progress: improving    Patient has declined needing any interventions for pain and states if he does have some pain he is able to get up and move around, pass gas and pain subsides.  Patient has denied any nausea, tolerating a liquid diet.  Active bowel sounds and has had some diarrhea this shift.  VSS,a febrile.

## 2023-03-13 NOTE — PROGRESS NOTES
[facility] DISCHARGE NOTE    Patient discharged to home at 12:10 PM , escorted by nephew Accompanied by staff. Discharge instructions reviewed with patient, education given regarding SBO and diet. opportunity offered to ask questions. All belongings sent with patient.    Kandy Haque RN

## 2023-03-13 NOTE — DISCHARGE SUMMARY
[facility]  Hospitalist Discharge Summary      Date of Admission:  3/9/2023  Date of Discharge:  3/13/2023  Discharging Provider: Danial Gore MD  Discharge Service: Hospitalist Service    Discharge Diagnoses   Principal Problem:    SBO (small bowel obstruction) (H)  BPH    Follow-ups Needed After Discharge   Follow-up Appointments     Follow-up and recommended labs and tests       Follow up with Dr. Jeffy Carrillo in [facility] on 3/17 at 1115             Discharge Disposition   Discharged to home  Condition at discharge: Stable      Hospital Course   Darvin Baird is a 72 year old male admitted on 3/9/2023. He has been transferred from [facility] where he has been treated and evaluated for small bowel obstruction.  Symptoms started acutely yesterday bringing him to the hospital with CT imaging showing small bowel obstruction.  He was treated conservatively overnight with NG decompression and today Gastrografin studies were done which did not show any migration of the dye past the stomach and pylorus.  He has been transferred here for surgical consultation, Dr. Scott Lees is aware and will see him in the morning.  Patient has had similar symptoms in the past, last time in 2016 which resolved after Gastrografin study.  Previous surgeries include appendectomy and right inguinal hernia repair.  Patient will be admitted, treated with IV fluids, pain management.  Underwent gastrografin challenge 3/11/23 with good return of bowel function. Diet advanced and tolerated. Passing stools with no complaints. Discussed a fiber supplement at discharge.         BPH  -Resume home doxazosin      Consultations This Hospital Stay   SURGERY GENERAL IP CONSULT    Code Status   Full Code    Time Spent on this Encounter   I, Danial Gore MD, personally saw the patient today and spent less than or equal to 30 minutes discharging this patient.       Danial Gore MD  [facility] AND  [facility]  [address]  [place] [address]  Phone: [phone]  Fax: [phone]  ______________________________________________________________________    Physical Exam   Vital Signs: Temp: 99.3  F (37.4  C) Temp src: Tympanic BP: 131/53 Pulse: 60   Resp: 18 SpO2: 96 % O2 Device: None (Room air)    Weight: 190 lbs 3.2 oz  GENERAL: Comfortable, no apparent distress.  CARDIOVASCULAR: regular rate and rhythm, no murmur. No lower extremity edema   RESPIRATORY: Clear to auscultation bilaterally, no wheezes or crackles.  GI: non-tender, non-distended, normal bowel sounds.   SKIN: warm periphery, no rashes         Primary Care Physician   Jeffy Carrillo    Discharge Orders      Reason for your hospital stay    Small bowel obstruction     Follow-up and recommended labs and tests     Follow up with Dr. Jeffy Carrillo in [facility] on 3/17 at 1115     Activity    Your activity upon discharge: activity as tolerated     Diet    Follow this diet upon discharge: Orders Placed This Encounter      Regular Diet Adult       Significant Results and Procedures   Most Recent 3 CBC's:Recent Labs   Lab Test 03/11/23  0600 03/10/23  0530 10/12/16  0530   WBC 8.3 10.0  --    HGB 14.4 15.2 13.7   MCV 90 90 88   * 148* 126*     Most Recent 3 BMP's:Recent Labs   Lab Test 03/13/23  0603 03/12/23  0550 03/11/23  0600    142 139   POTASSIUM 3.5 3.9 3.7   CHLORIDE 106 108* 105   CO2 29 29 30*   BUN 21.9 19.7 19.0   CR 0.78 0.74 0.77   ANIONGAP 6* 5* 4*   MISTI 8.1* 8.3* 8.4*   * 122* 124*     Most Recent 2 LFT's:Recent Labs   Lab Test 03/11/23  0600 03/10/23  0530   AST 13 15   ALT 13 15   ALKPHOS 75 87   BILITOTAL 0.7 0.6   ,   Results for orders placed or performed during the hospital encounter of 03/09/23   CT External Imagng    Narrative    Images were obtained from an external facility.  Click PACS Images   hyperlink to view images.  Textual results have been scanned into the   media tab.   CT External  Imagng    Narrative    Images were obtained from an external facility.  Click PACS Images   hyperlink to view images.  Textual results have been scanned into the   media tab.   XR Gastrografin Challenge    Narrative    PROCEDURE INFORMATION:   Exam: XR Abdomen   Exam date and time: 3/11/2023 8:12 PM   Age: 72 years old   Clinical indication: Constipation; Additional info: Small bowel obstruction     TECHNIQUE:   Imaging protocol: Radiologic exam of the abdomen.   Views:  Multiple sequential frontal views of the abdomen. 1 View.   Other contrast: ng;     COMPARISON:   No relevant prior studies available.     FINDINGS:   Tubes, catheters and devices: Enteric tube present, with tubing curled within   the gastric fundus and tip overlying the proximal duodenum.     Gastrointestinal tract: Dilated centralized small bowel loops measuring up to   5.6 cm. Progression of intraluminal contrast into the small bowel, with   additional dilated contrast noted throughout the large bowel.   Bones/joints: No acute abnormality.   Soft tissues: No soft tissue swelling is seen.       Impression    IMPRESSION:   Dilated small bowel loops, with findings suggestive of ileus versus partial   small bowel obstruction.  Enteric tube as above.    THIS DOCUMENT HAS BEEN ELECTRONICALLY SIGNED BY ALFREDO ELENA MD       Discharge Medications   Current Discharge Medication List      START taking these medications    Details   calcium polycarbophil (FIBERCON) 625 MG tablet Take 2 tablets (1,250 mg) by mouth daily    Associated Diagnoses: Small bowel obstruction (H)         CONTINUE these medications which have NOT CHANGED    Details   acetaminophen (TYLENOL) 325 MG tablet Take 2 tablets by mouth daily as needed (aches and pains)      Ascorbic Acid (VITAMIN C PO) Take 1 each by mouth daily      Cholecalciferol (VITAMIN D-3 PO) Take 1 each by mouth daily      doxazosin (CARDURA) 4 MG tablet Take 4 mg by mouth At Bedtime      ibuprofen (ADVIL/MOTRIN) 200  MG tablet Take 400 mg by mouth daily as needed (aches and pains)      MAGNESIUM PO Take 1 each by mouth daily      melatonin 5 MG tablet Take 5 mg by mouth At Bedtime      Multiple Vitamins-Minerals (ZINC PO) Take 1 each by mouth daily      naproxen sodium 220 MG capsule Take 1 capsule by mouth daily as needed (aches and pains)           Allergies   No Known Allergies

## 2023-03-13 NOTE — PHARMACY - DISCHARGE MEDICATION RECONCILIATION AND EDUCATION
Pharmacy:  Discharge Counseling and Medication Reconciliation    Darvin Baird  [address]  [place] [address]  [phone] (home)   72 year old male  PCP: Jeffy Carrillo    Allergies: Patient has no known allergies.    Discharge Counseling:    Pharmacist met with patient today to review the medication portion of the After Visit Summary (with an emphasis on NEW medications) and to address patient's questions/concerns.    Summary of Education: Counseled patient on new medication of Calcium polycarbophil, including indication and administration. Patient informed that he could obtain this over the counter, this medication does not require a prescription.    Materials Provided:  MedCounselor sheets printed from Clinical Pharmacology on: Calcium polycarbophil    Discharge Medication Reconciliation:    It has been determined that the patient has an adequate supply of medications available or which can be obtained from the patient's preferred pharmacy.    Thank you for the consult.    Marvel Sherman [facility]........March 13, 2023 11:22 AM

## 2023-03-14 ENCOUNTER — PATIENT OUTREACH (OUTPATIENT)
Dept: FAMILY MEDICINE | Facility: OTHER | Age: 73
End: 2023-03-14
Payer: COMMERCIAL

## 2023-03-14 NOTE — TELEPHONE ENCOUNTER
Patient has PCP elsewhere, no follow-up here. No TCM call required per policy.  Tran Pemberton RN on 3/14/2023 at 7:55 AM